# Patient Record
(demographics unavailable — no encounter records)

---

## 2018-03-16 NOTE — RADIOLOGY REPORT (SQ)
EXAM DESCRIPTION: 



KNEE RIGHT 3 VIEWS



CLINICAL HISTORY: 



44 years, Female, knee pain



COMPARISON:

None.



NUMBER OF VIEWS:

3



Findings:



Minimal osteoarthritis of the medial patellofemoral compartment.

Bones, joints, and soft tissues of KNEE RIGHT 3 VIEWS appear

otherwise intact. No significant effusion.



IMPRESSION:



No acute findings.

## 2018-03-16 NOTE — ER DOCUMENT REPORT
ED Extremity Problem, Lower





- General


Chief Complaint: Knee Pain


Stated Complaint: RIGHT KNEE PAIN


Time Seen by Provider: 03/16/18 22:46


Mode of Arrival: Wheelchair


Information source: Patient


Notes: 





44-year-old female presents to ED for complaint of right knee pain since 

Thursday.  She states she does not remember injuring it at all.  She states she 

does have bursitis in the right hip and went to her doctor this week and was 

diagnosed with plantar fasciitis in her foot.  She states the knee just started 

hurting Thursday and she does not remember any injuries.  States she stands on 

her feet for 4-5 hours at a time.  She states she has low back pain with 

sciatica on that side.  Patient refused Tylenol Motrin and ice pack


TRAVEL OUTSIDE OF THE [place] IN LAST 30 DAYS: No





- HPI


Patient complains to provider of: Pain.  No: Injury


Location: Knee


Occurred: Other - Thursday


Where: Home


Onset/Duration: Gradual


Quality of pain: Sharp


Severity: Moderate


Pain Level: 4


Context: Other


Recent injury: Possibly - Not remember any injuries


Associated symptoms: Painful ambulation


Exacerbated by: Hanging down, Movement, Walking


Relieved by: Nothing





- Related Data


Allergies/Adverse Reactions: 


 





flu vaccine Allergy (Uncoded 08/28/17 18:56)


 











Past Medical History





- General


Information source: Patient





- Social History


Smoking Status: Never Smoker


Cigarette use (# per day): No


Chew tobacco use (# tins/day): No


Smoking Education Provided: No


Frequency of alcohol use: None


Drug Abuse: None


Occupation: [employer]


Lives with: Family


Family History: Arthritis, CAD, CVA, Hyperlipidemia, Hypertension, Malignancy, 

Other - Sister has allergy and asthma.  denies: COPD, DM, Thyroid Disfunction


Patient has suicidal ideation: No


Patient has homicidal ideation: No





- Past Medical History


Cardiac Medical History: Reports: Hx Hypertension


Pulmonary Medical History: Reports: Hx Asthma, Hx Bronchitis


EENT Medical History: Reports: None


Neurological Medical History: Reports: None


Endocrine Medical History: Reports: None, Hx Diabetes Mellitus Type 2 - diet 

controlled


Renal/ Medical History: Reports: Hx Kidney Stones


Malignancy Medical History: Reports: None


Musculoskeltal Medical History: Reports Hx Arthritis - Chronic low back pain, 

bursitis and plantar fasciitis all on the right side, Reports Hx 

Musculoskeletal Deformity


Skin Medical History: Reports None


Psychiatric Medical History: Reports: None


Traumatic Medical History: Reports: None


Past Surgical History: Reports: Hx Kidney (Renal Surgery) - lithotripsy





- Immunizations


Immunizations up to date: Yes


Hx Diphtheria, Pertussis, Tetanus Vaccination: No - >5 yrs





Review of Systems





- Review of Systems


Constitutional: No symptoms reported


EENT: No symptoms reported


Cardiovascular: No symptoms reported


Respiratory: No symptoms reported


Gastrointestinal: No symptoms reported


Genitourinary: No symptoms reported


Female Genitourinary: No symptoms reported


Musculoskeletal: Back pain - Chronic, Joint pain - Right knee, Other - Chronic 

low back pain chronic sciatica chronic bursitis in the right hip chronic 

plantar fasciitis in the right foot


Skin: No symptoms reported


Hematologic/Lymphatic: No symptoms reported


Neurological/Psychological: No symptoms reported


-: Yes All other systems reviewed and negative





Physical Exam





- Vital signs


Vitals: 


 











Temp Pulse Resp BP Pulse Ox


 


 97.6 F   81   16   139/94 H  97 


 


 03/16/18 21:36  03/16/18 21:36  03/16/18 21:36  03/16/18 21:36  03/16/18 21:36











Interpretation: Normal





- General


General appearance: Appears well, Alert





- HEENT


Head: Normocephalic, Atraumatic


Eyes: Normal


Pupils: PERRL





- Respiratory


Respiratory status: No respiratory distress


Chest status: Nontender


Breath sounds: Normal


Chest palpation: Normal





- Cardiovascular


Rhythm: Regular


Heart sounds: Normal auscultation


Murmur: No





- Abdominal


Inspection: Normal


Distension: No distension


Bowel sounds: Normal


Tenderness: Nontender


Organomegaly: No organomegaly





- Back


Back: Normal, Tender.  No: Deformity/step-off, CVA tenderness, Vertebra 

tenderness, Scars, Scoliosis, Wounds





- Extremities


General upper extremity: Normal inspection, Nontender, Normal color, Normal ROM

, Normal temperature


General lower extremity: Normal color, Normal ROM, Normal temperature, Normal 

weight bearing.  No: Roly's sign


Hip: Tender - Chronic


Thigh: Normal, Nontender


Knee: Tender, Pain with ROM, Patellar tendon intact, Tender joint line.  No: 

Abrasion, Deformity, Dislocation, Drawer's test instability, Ecchymosis, 

Instability, Laceration, Laxity with valgus stress, Laxity with varus stress, 

Popliteal fossa tender, Unable to bear weight


Calf: Normal, Nontender


Ankle: Normal, Nontender


Foot: Tender, No evidence of FB - Chronic.  No: Abrasion, Ecchymosis, Edema, 

Instability, Laceration, Metatarsal compress. pain, Nail injury, Navicular 

tenderness





- Neurological


Neuro grossly intact: Yes


Cognition: Normal


Orientation: AAOx4


[place] Coma Scale Eye Opening: Spontaneous


[place] Coma Scale Verbal: Oriented


[place] Coma Scale Motor: Obeys Commands


[place] Coma Scale Total: 15


Speech: Normal


Motor strength normal: LUE, RUE, LLE, RLE


Sensory: Normal





- Psychological


Associated symptoms: Normal affect, Normal mood





- Skin


Skin Temperature: Warm


Skin Moisture: Dry


Skin Color: Normal





Course





- Vital Signs


Vital signs: 


 











Temp Pulse Resp BP Pulse Ox


 


 98.3 F   72   16   138/80 H  99 


 


 03/16/18 22:59  03/16/18 23:44  03/16/18 23:44  03/16/18 23:44  03/16/18 23:44














Procedures





- Immobilization


  ** Right Knee


Time completed: 23:35


Immobilizer type: Ace wrap


Performed by: RN


Post-Proc Neuro Vasc Exam: Normal


Alignment checked and good: Yes





Discharge





- Discharge


Clinical Impression: 


 Arthritis of right knee





Condition: Stable


Disposition: HOME, SELF-CARE


Instructions:  Family Physicians / Practices


Additional Instructions: 


Arthritis





     Your symptoms are due to arthritis.  Arthritis is an inflammation of the 

joints.  There are many types -- osteoarthritis (due to "wear and tear"), auto-

immmune arthritis (such as rheumatoid, lupus, Nathanael's, and others), and crystal

-induced arthritis (such as gout and pseudogout).  The physician's examination, 

combined with laboratory tests, will determine the cause of your arthritis.


     All types of arthritis are treated with antiinflammatory medications.  

Other medication may be required for special types of arthritis, or if your 

problem does not respond to the antiinflammatory medicine.


     Local warmth may be helpful.  Move the involved joints through the full 

range of motion daily.  Mild exercise is usually still possible for most 

persons with arthritis (ask your physician).  Swimming provides good exercise 

without damaging the joints.


     Contact the physician if you are worsening in any way.





Acetaminophen





     Acetaminophen may be taken for pain relief or fever control. It's much 

safer than aspirin, offering a wider range of "safe" dosages.  It is safe 

during pregnancy.  Some brand names are Tylenol, Panadol, Datril, Anacin 3, 

Tempra, and Liquiprin. Acetaminophen can be repeated every four hours.  The 

following are maximum recommended dosages:





WEIGHT         Dose             Drops                  Elixir        Chewable(

80mg)


(LBS.)                            drprs=droppers    tsp=teaspoon


6                 40 mg            .4 ml (1/2)


6-11            80 mg            .8 ml (full)            1/2   tsp           1 

      tab


12-16         120 mg           1 1/2 drprs            3/4   tsp           1 1/2

  tabs


17-23         160 mg             2  drprs              1      tsp           2  

     tabs


24-30         240 mg             3  drprs              1 1/2 tsp           3   

    tabs


30-35         320 mg                                     2       tsp           

4       tabs


36-41         360 mg                                     2 1/4 tsp           4 1

/2  tabs


42-47         400 mg                                     2 1/2 tsp           5 

      tabs


48-53         480 mg                                     3       tsp          6

       tabs


54-59         520 mg                                     3  1/4 tsp          6 1

/2 tabs


60-64         560 mg                                     3  1/2 tsp          7 

     tabs 


65-70         600 mg                                     3  3/4 tsp          7 1

/2 tabs


71-76         640 mg                                     4       tsp           

8      tabs


77-82         720 mg                                     4 1/2  tsp           9

      tabs


83-88         800 mg                                     5       tsp           

10     tabs





>89 pounds or adults          650 mg to 900 mg 





Acetaminophen can be repeated every four hours. Maximum daily dose not to 

exceed 4000 mg.





   These maximum recommended dosages are slightly higher than the dosages 

written on the product container, but these dosages are very safe and well 

below the toxic dosage for acetaminophen.





Ibuprofen





     Ibuprofen is an excellent, safe drug for pain control.  In addition, it 

has potent antiinflammatory effects which are beneficial, especially in the 

treatment of injuries, arthritis, or tendonitis. It's best to take ibuprofen 

with food.  Persons with ulcer disease or allergy to aspirin should notify 

their physician of this before taking ibuprofen.


     Take the medication exactly as prescribed.  Don't take additional doses 

unless instructed to do so by your doctor.  If you develop wheezing, shortness 

of breath, hives, faintness, stomach pain, vomiting, or dark black stools, 

return for re-evaluation at once.





FOLLOW-UP CARE:


If you have been referred to a physician for follow-up care, call the physician

s office for an appointment as you were instructed or within the next two days.

  If you experience worsening or a significant change in your symptoms, notify 

the physician immediately or return to the Emergency Department at any time for 

re-evaluation.


Forms:  Elevated Blood Pressure, Return to Work


Referrals: 


AIDAN BECERRA MD [ACTIVE STAFF] - Follow up as needed

## 2018-03-26 NOTE — ER DOCUMENT REPORT
ED General





- General


Chief Complaint: Eye Pain


Stated Complaint: EYE PAIN


Time Seen by Provider: 03/26/18 21:41


Mode of Arrival: Ambulatory


Notes: 





Patient is a 44 year old female who does not wear corrective lenses presents 

with left eye pain.  Patient reports that this started after she woke up from a 

nap in which she fell asleep while wearing a pair of sunglasses that belonged 

to her .  She states that since that time she has had a burning, constant

, scratching pain to her left eye.  She tried saline flushes with minimal 

improvement of the pain.  Nothing worsens the pain.  She denies any history of 

similar symptoms in the past.  No changes to visual acuity.  She has been 

unable to see her ophthalmologist regarding today's concerns.  She denies any 

discharge from the eye.


TRAVEL OUTSIDE OF THE [place] IN LAST 30 DAYS: No





- Related Data


Allergies/Adverse Reactions: 


 





flu vaccine Allergy (Uncoded 08/28/17 18:56)


 











Past Medical History





- General


Information source: Patient





- Social History


Smoking Status: Never Smoker


Frequency of alcohol use: None


Drug Abuse: None


Lives with: Spouse/Significant other


Family History: Arthritis, CAD, CVA, Hyperlipidemia, Hypertension, Malignancy, 

Other - Sister has allergy and asthma.  denies: COPD, DM, Thyroid Disfunction


Patient has suicidal ideation: No


Patient has homicidal ideation: No





- Past Medical History


Cardiac Medical History: Reports: Hx Hypertension


Pulmonary Medical History: Reports: Hx Asthma, Hx Bronchitis


Endocrine Medical History: Reports: Hx Diabetes Mellitus Type 2 - diet 

controlled


Renal/ Medical History: Reports: Hx Kidney Stones.  Denies: Hx Peritoneal 

Dialysis


Musculoskeltal Medical History: Reports Hx Arthritis - Chronic low back pain, 

bursitis and plantar fasciitis all on the right side, Reports Hx 

Musculoskeletal Deformity


Past Surgical History: Reports: Hx Kidney (Renal Surgery) - lithotripsy





- Immunizations


Immunizations up to date: Yes


Hx Diphtheria, Pertussis, Tetanus Vaccination: No - >5 yrs





Review of Systems





- Review of Systems


Notes: 





Constitutional: Negative for fever.


HENT: Negative for sore throat.


Eyes: Negative for visual changes positive for eye pain


Cardiovascular: Negative for chest pain.


Respiratory: Negative for shortness of breath.


Gastrointestinal: Negative for abdominal pain, vomiting or diarrhea.


Genitourinary: Negative for dysuria.


Musculoskeletal: Negative for back pain.


Skin: Negative for rash.


Neurological: Negative for headaches, weakness or numbness.





10 point ROS negative except as marked above and in HPI.





Physical Exam





- Vital signs


Vitals: 


 











Temp Pulse Resp BP Pulse Ox


 


 97.6 F   84   20   148/82 H  98 


 


 03/26/18 19:44  03/26/18 19:44  03/26/18 19:44  03/26/18 19:44  03/26/18 19:44











Interpretation: Hypertensive


Notes: 





PHYSICAL EXAMINATION:





GENERAL: Well-appearing, well-nourished and in no acute distress.





HEAD: Atraumatic, normocephalic.





EYES:  sclera anicteric, there is a corneal abrasion to the lateral inferior 

portion of the left eye with an apparent corneal flap.  This is visualized 

under fluorescein staining.  Extraocular motions intact.  Pupillary response 

equal and reactive bilaterally.  Visual acuity 20/20 bilaterally at the 

bedside.  Mild conjunctival injection on the left, normal on the right.





ENT: Moist mucous membranes.





NECK: Normal range of motion





LUNGS: Normal work of breathing





HEART: 2+ radial pulses bilaterally





EXTREMITIES: no pitting or edema.  No cyanosis.





NEUROLOGICAL: No focal neurological deficits. Moves all extremities 

spontaneously and on command.





PSYCH: Normal mood, normal affect.





SKIN: Warm, Dry, normal turgor, no rashes or lesions noted.





Course





- Re-evaluation


Re-evalutation: 





03/26/18 23:39


Patient presents with a corneal abrasion to the left eye with an associated 

corneal flap.  There is no evidence of retained foreign body.  Extraocular 

motions are intact.  Pupillary responses within normal limits.  She does not 

use contacts.  Patient will be started on Polytrim drops, ketorolac drops for 

pain.  I have instructed her to follow up with her optometrist or 

ophthalmologist in the morning.  At this time will discharge with return 

precautions and follow-up recommendations.  Verbal discharge instructions given 

a the bedside and opportunity for questions given. Medication warnings 

reviewed. Patient is in agreement with this plan and has verbalized 

understanding of return precautions and the need for eye care follow-up.





- Vital Signs


Vital signs: 


 











Temp Pulse Resp BP Pulse Ox


 


 97.6 F   84   20   148/82 H  98 


 


 03/26/18 19:44  03/26/18 19:44  03/26/18 19:44  03/26/18 19:44  03/26/18 19:44














Discharge





- Discharge


Clinical Impression: 


 Fold in corneal flap of left eye





Corneal abrasion, left


Qualifiers:


 Encounter type: initial encounter Qualified Code(s): S05.02XA - Injury of 

conjunctiva and corneal abrasion without foreign body, left eye, initial 

encounter





Condition: Good


Disposition: HOME, SELF-CARE


Additional Instructions: 


You have a corneal abrasion.  This should improve in the next several days.  

You should apply the eye drops to the affected eye 3 times daily.  Follow-up 

with your eye doctor at your earliest ability preferably tomorrow.  Return if 

you have decreased vision, worsening pain, increased drainage from the eye, you 

notice redness or puffiness around the eye, you develop a fever greater than 101

F, or you have any other symptoms that are concerning to you.

## 2018-03-26 NOTE — ER DOCUMENT REPORT
ED Medical Screen (RME)





- General


Chief Complaint: Eye Pain


Stated Complaint: EYE PAIN


Time Seen by Provider: 03/26/18 21:41


Mode of Arrival: Ambulatory


Information source: Patient


Notes: 





44-year-old female presents to ED for complaint of left eye pain that started 

this evening when they pulled into [place] about 645.  She has a small 

little whitish foreign material to the 5 o'clock position on her left eye.  It 

does not flush it with saline.  Was treated with 1 drop of tetracaine to the 

left eye to help with the pain until she is observed in the main ED.











I have greeted and performed a rapid initial assessment of this patient.  A 

comprehensive ED assessment and evaluation of the patient, analysis of test 

results and completion of medical decision making process will be conducted by 

an additional ED providers.


TRAVEL OUTSIDE OF THE [place] IN LAST 30 DAYS: No





- Related Data


Allergies/Adverse Reactions: 


 





flu vaccine Allergy (Uncoded 08/28/17 18:56)


 











Past Medical History





- Social History


Family history: Reviewed & Not Pertinent





- Past Medical History


Cardiac Medical History: Reports: Hx Hypertension


Pulmonary Medical History: Reports: Hx Asthma, Hx Bronchitis


Endocrine Medical History: Reports: Hx Diabetes Mellitus Type 2 - diet 

controlled


Renal/ Medical History: Reports: Hx Kidney Stones.  Denies: Hx Peritoneal 

Dialysis


Musculoskeltal Medical History: Reports Hx Arthritis - Chronic low back pain, 

bursitis and plantar fasciitis all on the right side, Reports Hx 

Musculoskeletal Deformity


Past Surgical History: Reports: Hx Kidney (Renal Surgery) - lithotripsy





- Immunizations


Immunizations up to date: Yes


Hx Diphtheria, Pertussis, Tetanus Vaccination: No - >5 yrs





Physical Exam





- Vital signs


Vitals: 





 











Temp Pulse Resp BP Pulse Ox


 


 97.6 F   84   20   148/82 H  98 


 


 03/26/18 19:44  03/26/18 19:44  03/26/18 19:44  03/26/18 19:44  03/26/18 19:44














Course





- Vital Signs


Vital signs: 





 











Temp Pulse Resp BP Pulse Ox


 


 97.6 F   84   20   148/82 H  98 


 


 03/26/18 19:44  03/26/18 19:44  03/26/18 19:44  03/26/18 19:44  03/26/18 19:44

## 2018-05-27 NOTE — EKG REPORT
SEVERITY:- OTHERWISE NORMAL ECG -

SINUS RHYTHM

ATRIAL PREMATURE COMPLEX

:

Confirmed by: Luis Matute MD 27-May-2018 14:44:53

## 2018-05-27 NOTE — RADIOLOGY REPORT (SQ)
EXAM DESCRIPTION:  CHEST 2 VIEWS



COMPLETED DATE/TIME:  5/27/2018 11:10 am



REASON FOR STUDY:  syncope and coughing green sputum



COMPARISON:  11/7/2016



EXAM PARAMETERS:  NUMBER OF VIEWS: two views

TECHNIQUE: Digital Frontal and Lateral radiographic views of the chest acquired.

RADIATION DOSE: NA

LIMITATIONS: none



FINDINGS:  LUNGS AND PLEURA: No opacities, masses or pneumothorax. No pleural effusion.

MEDIASTINUM AND HILAR STRUCTURES: No masses or contour abnormalities.

HEART AND VASCULAR STRUCTURES: Heart normal size.  No evidence for failure.

BONES: No acute findings.

HARDWARE: None in the chest.

OTHER: No other significant finding.



IMPRESSION:  NO ACUTE RADIOGRAPHIC FINDING IN THE CHEST.



TECHNICAL DOCUMENTATION:  JOB ID:  [id number]

 2011 [employer]- All Rights Reserved



Reading location - IP/workstation name: [email or web address]

## 2018-05-27 NOTE — ER DOCUMENT REPORT
ED Syncope and Near Syncope





- General


Chief Complaint: Dizziness


Stated Complaint: COUGH


Time Seen by Provider: 05/27/18 10:41


Mode of Arrival: Ambulatory


Information source: Patient


Notes: 





Chief complaint: Passed out








History of complain:( obtained from----patient) 44 years old male with a 

history of asthma, hypertension, last evening was feeling dizzy when she walked 

to the door and felt lightheaded and passed out.  She does not remember any 

symptoms prior to that or after that.  She cannot recollect how long she was 

out.  When she woke up and requested the  to bring her to the ED.  

Apparently he refused.


This morning she decided to come to the ED.  And came with her son.  She claims 

that she could not dial 911.


Currently feeling lightheaded dizzy when she sits up or stands up.  She takes 

10 mg of lisinopril, last blood pressure check at the doctor's office was 140 

systole.


Still having on and off wheezing.


Currently denies any headache focal weakness numbness tingling sensation.  

Denies any chest pain palpitation or diaphoresis.  Denies any nausea vomiting








Onset: As about


Severity: Mild to moderate


Quality: Dull


Context: Sitting or standing make it worse


Exacerbating factor and relieving factors: Sitting or standing make it was 

laying down is comfortable











REVIEW OF SYSTEMS:


CONSTITUTIONAL :  Denies fever,  chills, or sweats.  Denies recent illness.


EENT:   Denies eye, ear, throat, or mouth pain or symptoms.  Denies nasal or 

sinus congestion or discharge.  Denies throat, tongue, or mouth swelling or 

difficulty swallowing.


CARDIOVASCULAR:  Denies chest pain.  Denies palpitations or racing or irregular 

heart beat.  Denies ankle edema.


RESPIRATORY:  Denies cough, cold, or chest congestion.  Denies shortness of 

breath, difficulty breathing, or wheezing.


GASTROINTESTINAL:  Denies  distention.  Denies nausea, vomiting, or diarrhea.  

Denies blood in vomitus, stools, or per rectum.  Denies black, tarry stools.  

Denies constipation. 


GENITOURINARY:  Denies difficulty urinating, painful urination, burning, 

frequency, blood in urine, or discharge.


FEMALE  GENITOURINARY:  Denies vaginal bleeding, heavy or abnormal periods, 

irregular periods.  Denies vaginal discharge or odor. 


MUSCULOSKELETAL:  Denies back or neck pain or stiffness.  Denies joint pain or 

swelling.


SKIN:   Denies rash, lesions or sores.


HEMATOLOGIC :   Denies easy bruising or bleeding.


LYMPHATIC:  Denies swollen, enlarged glands.


NEUROLOGICAL:  Denies confusion or altered mental status.   Denies headache.  

Denies weakness or paralysis or loss of use of either side.  Denies problems 

with gait or speech.  Denies sensory loss, numbness, or tingling.  Denies 

seizures.


PSYCHIATRIC:  Denies anxiety or stress.  Denies depression, suicidal ideation, 

or homicidal ideation.





ALL OTHER SYSTEMS REVIEWED AND NEGATIVE.











PHYSICAL EXAMINATION:





GENERAL: Well-appearing, well-nourished and in no acute distress.  Not seems to 

be in any acute distress





HEAD: Atraumatic, normocephalic.





EYES: Pupils equal round and reactive to light, extraocular movements intact, 

conjunctiva are normal.





ENT: Nares patent, oropharynx clear without exudates.  Moist mucous membranes.





NECK: Normal range of motion, supple without lymphadenopathy





LUNGS: Breath sounds clear to auscultation bilaterally and equal.  Diffuse 

bilateral wheezing, no rales mild to moderate.





HEART: Regular rate and rhythm without murmurs





ABDOMEN: Soft, nontender, nondistended abdomen.  No guarding, no rebound.  No 

masses appreciated.





Examination of genitals-deferred





Musculoskeletal: Normal range of motion, no pitting or edema.  No cyanosis.





NEUROLOGICAL: Cranial nerves grossly intact.  Normal speech, normal gait.  

Normal sensory, motor exams





PSYCH: Normal mood, normal affect.





SKIN: Warm, Dry, normal turgor, no rashes or lesions noted.

















Dictation was performed using Dragon voice recognition software


TRAVEL OUTSIDE OF THE [place] IN LAST 30 DAYS: No





- HPI


Notes: 





Dictated





- Related Data


Allergies/Adverse Reactions: 


 





adhesive Allergy (Verified 05/27/18 10:27)


 


flu vaccine Allergy (Uncoded 08/28/17 18:56)


 











Past Medical History





- General


Information source: Patient





- Social History


Smoking Status: Never Smoker


Chew tobacco use (# tins/day): No


Frequency of alcohol use: None


Drug Abuse: None


Family History: Arthritis, CAD, CVA, Hyperlipidemia, Hypertension, Malignancy, 

Other - Sister has allergy and asthma.  denies: COPD, DM, Thyroid Disfunction


Patient has suicidal ideation: No


Patient has homicidal ideation: No





- Past Medical History


Cardiac Medical History: Reports: Hx Hypertension


Pulmonary Medical History: Reports: Hx Asthma, Hx Bronchitis


Endocrine Medical History: Reports: Hx Diabetes Mellitus Type 2 - diet 

controlled


Renal/ Medical History: Reports: Hx Kidney Stones.  Denies: Hx Peritoneal 

Dialysis


Musculoskeltal Medical History: Reports Hx Arthritis - Chronic low back pain, 

bursitis and plantar fasciitis all on the right side, Reports Hx 

Musculoskeletal Deformity


Past Surgical History: Reports: Hx Kidney (Renal Surgery) - lithotripsy





- Immunizations


Immunizations up to date: Yes


Hx Diphtheria, Pertussis, Tetanus Vaccination: No - >5 yrs





Review of Systems





- Review of Systems


Notes: 





Dictated





Physical Exam





- Vital signs


Vitals: 


 











Temp Pulse Resp BP Pulse Ox


 


 98.3 F   73   20   135/73 H  98 


 


 05/27/18 10:32  05/27/18 10:32  05/27/18 10:32  05/27/18 10:32  05/27/18 10:32














- Notes


Notes: 





Dictated





Course





- Re-evaluation


Re-evalutation: 





05/27/18 12:41


She was given IV fluid, feeling better, asked to cut down her blood pressure 

medicine from 10 mg of lisinopril to 5.





- Vital Signs


Vital signs: 


 











Temp Pulse Resp BP Pulse Ox


 


 98.3 F   73   20   135/73 H  98 


 


 05/27/18 10:32  05/27/18 10:32  05/27/18 10:42  05/27/18 10:32  05/27/18 10:32














- Laboratory


Result Diagrams: 


 05/27/18 11:23





 05/27/18 11:23


Laboratory results interpreted by me: 


 











  05/27/18





  11:23


 


WBC  11.3 H


 


Hgb  10.0 L


 


Hct  31.4 L


 


MCV  72 L


 


MCH  22.7 L


 


MCHC  31.7 L


 


RDW  18.3 H


 


Plt Count  480 H














- Diagnostic Test


Radiology reviewed: Reports reviewed - CT of the brain-reported by radiologist 

as normal Chest x-ray reported by radiologist as normal





- EKG Interpretation by Me


EKG shows normal: Sinus rhythm, Axis


Rate: Normal


Rhythm: NSR - Normal sinus rhythm at the rate of 63 bpm normal axis no acute ST 

elevation ST depression T-wave inversion noted.





Discharge





- Discharge


Clinical Impression: 


 Hypotensive episode





Syncope


Qualifiers:


 Syncope type: unspecified Qualified Code(s): R55 - Syncope and collapse





Exacerbation of asthma


Qualifiers:


 Asthma severity: moderate Asthma persistence: unspecified Qualified Code(s): 

J45.901 - Unspecified asthma with (acute) exacerbation





Condition: Fair


Disposition: HOME, SELF-CARE


Instructions:  Dizziness (Critical access hospital), Asthma (Critical access hospital)


Prescriptions: 


Albuterol Sulfate [Proair HFA Inhalation Aerosol 8.5 gm MDI] 2 puff IH Q4H PRN #

1 mdi


 PRN Reason: 


Prednisone [Deltasone 20 mg Tablet] 1 tab PO DAILY 7 Days #7 tablet

## 2018-05-27 NOTE — RADIOLOGY REPORT (SQ)
EXAM DESCRIPTION:  CT HEAD WITHOUT



COMPLETED DATE/TIME:  5/27/2018 11:41 am



REASON FOR STUDY:  Head injury



COMPARISON:  None.



TECHNIQUE:  Axial images acquired through the brain without intravenous contrast.  Images reviewed wi
th bone, brain and subdural windows.  Additional sagittal and coronal reconstructions were generated.
 Images stored on PACS.

All CT scanners at this facility use dose modulation, iterative reconstruction, and/or weight based d
osing when appropriate to reduce radiation dose to as low as reasonably achievable (ALARA).

[facility]: Dose Right  [facility]: CareDose    [facility]: Dose Right    [facility]: Teradose 4D    [facility]: Smart Technologies



RADIATION DOSE:  CT Rad equipment meets quality standard of care and radiation dose reduction techniq
ues were employed. CTDIvol: 53.2 mGy. DLP: 964 mGy-cm. mGy.



LIMITATIONS:  None.



FINDINGS:  VENTRICLES: Normal size and contour.

CEREBRUM: No masses.  No hemorrhage.  No midline shift.  No evidence for acute infarction. Normal gra
y/white matter differentiation. No areas of low density in the white matter.

CEREBELLUM: No masses.  No hemorrhage.  No alteration of density.  No evidence for acute infarction.

EXTRAAXIAL SPACES: No fluid collections.  No masses.

ORBITS AND GLOBE: No intra- or extraconal masses.  Normal contour of globe without masses.

CALVARIUM: No fracture.

PARANASAL SINUSES: Fluid in the ethmoid sinuses.  Mucosal thickening in the maxillary sinuses.

SOFT TISSUES: No mass or hematoma.

OTHER: No other significant finding.



IMPRESSION:  NORMAL BRAIN CT WITHOUT CONTRAST.

EVIDENCE OF ACUTE STROKE: NO.



COMMENT:  Quality ID # [id number]: Final reports with documentation of one or more dose reduction techniques
 (e.g., Automated exposure control, adjustment of the mA and/or kV according to patient size, use of 
iterative reconstruction technique)



TECHNICAL DOCUMENTATION:  JOB ID:  [id number]

 2011 [employer]- All Rights Reserved



Reading location - IP/workstation name: [email or web address]

## 2018-05-27 NOTE — ER DOCUMENT REPORT
ED Medical Screen (RME)





- General


Chief Complaint: Dizziness


Stated Complaint: COUGH


Time Seen by Provider: 05/27/18 10:41


Notes: 





RAPID MEDICAL EVALUATION DISCLOSURE


I have seen this patient as part of a Rapid Medical Evaluation and, if 

applicable, placed any initially appropriate orders. The patient will be seen 

and fully evaluated, including a full history and physical exam, by a provider (

in Main ED or Fast Track) when a room becomes available.





------------------------------------------------------------------





44-year-old female here with complaints of wheezing shortness of breath and 

cough productive of green sputum ongoing for the past 1 week (has used her 

inhaler with minimal relief).  Yesterday, she started to have bilateral arm 

pain and numbness,, generalized weakness, diaphoresis, lightheadedness/dizziness

, and passed out while standing.  She did not have any chest pain at the time.  

She denies hitting her head after the fall.  She has been drinking plenty of 

water and staying hydrated.





EXAM


Minimal and expiratory wheezes diffusely


Normal aeration throughout


RRR








TRAVEL OUTSIDE OF THE [place] IN LAST 30 DAYS: No





- Related Data


Allergies/Adverse Reactions: 


 





adhesive Allergy (Verified 05/27/18 10:27)


 


flu vaccine Allergy (Uncoded 08/28/17 18:56)


 











Past Medical History





- Social History


Family history: Reviewed & Not Pertinent





- Past Medical History


Cardiac Medical History: Reports: Hx Hypertension


Pulmonary Medical History: Reports: Hx Asthma, Hx Bronchitis


Endocrine Medical History: Reports: Hx Diabetes Mellitus Type 2 - diet 

controlled


Renal/ Medical History: Reports: Hx Kidney Stones.  Denies: Hx Peritoneal 

Dialysis


Musculoskeltal Medical History: Reports Hx Arthritis - Chronic low back pain, 

bursitis and plantar fasciitis all on the right side, Reports Hx 

Musculoskeletal Deformity


Past Surgical History: Reports: Hx Kidney (Renal Surgery) - lithotripsy





- Immunizations


Immunizations up to date: Yes


Hx Diphtheria, Pertussis, Tetanus Vaccination: No - >5 yrs





Physical Exam





- Vital signs


Vitals: 





 











Temp Pulse Resp BP Pulse Ox


 


 98.3 F   73   20   135/73 H  98 


 


 05/27/18 10:32  05/27/18 10:32  05/27/18 10:32  05/27/18 10:32  05/27/18 10:32














Course





- Vital Signs


Vital signs: 





 











Temp Pulse Resp BP Pulse Ox


 


 98.3 F   73   20   135/73 H  98 


 


 05/27/18 10:32  05/27/18 10:32  05/27/18 10:32  05/27/18 10:32  05/27/18 10:32

## 2018-07-22 NOTE — ER DOCUMENT REPORT
HPI





- HPI


Patient complains to provider of: Facial pain nose pressure headache


Onset: Other - Wednesday


Onset/Duration: Gradual


Quality of pain: Pressure


Severity: Severe


Pain Level: 5


Associated Symptoms: Headache, Sinus pain/drainage, Other - Pain to nose and 

bilateral facial pain


Exacerbated by: Other - Bending forward palpation of the nose or the face


Relieved by: Denies


Similar symptoms previously: Yes


Recently seen / treated by doctor: No





- ROS


ROS below otherwise negative: Yes





- CONSTITUTIONAL


Constitutional: DENIES: Fever, Chills





- EENT


Notes: 





Patient is very tender to bilateral cheekbones and nose.  She states she has 

severe pressure in her nose and face when she leans forward.  Patient is 

afebrile at this time.  Nasal turbinates red and swollen with clear drainage





- NEURO


Neurology: REPORTS: Headache





- CARDIOVASCULAR


Cardiovascular: DENIES: Chest pain





- RESPIRATORY


Respiratory: DENIES: Trouble Breathing, Coughing





- GASTROINTESTINAL


Gastrointestinal: DENIES: Abdominal Pain, Nausea, Patient vomiting, Diarrhea, 

Constipation, Black / Bloody Stools





- URINARY


Urinary: DENIES: Dysuria, Urgency, Frequency





- REPRODUCTIVE


Reproductive: DENIES: Pregnant:, Postmenopausal, Abnormal bleeding / discharge





- MUSCULOSKELETAL


Musculoskeletal: DENIES: Extremity pain, Back Pain, Neck Pain, Swelling





- DERM


Skin Color: Normal


Skin Problems: None





Past Medical History





- General


Information source: Patient





- Social History


Smoking Status: Former Smoker


Cigarette use (# per day): No


Chew tobacco use (# tins/day): No


Smoking Education Provided: No


Frequency of alcohol use: None


Drug Abuse: None


Lives with: Family


Family History: Arthritis, CAD, CVA, Hyperlipidemia, Hypertension, Malignancy, 

Other - Sister has allergy and asthma.  denies: COPD, DM, Thyroid Disfunction


Patient has suicidal ideation: No


Patient has homicidal ideation: No





- Past Medical History


Cardiac Medical History: Reports: Hx Hypertension


Pulmonary Medical History: Reports: Hx Asthma, Hx Bronchitis


EENT Medical History: Reports: None


Neurological Medical History: Reports: None


Endocrine Medical History: Reports: Hx Diabetes Mellitus Type 2 - diet 

controlled


Renal/ Medical History: Reports: Hx Kidney Stones


Malignancy Medical History: Reports: None


GI Medical History: Reports: None


Musculoskeletal Medical History: Reports Hx Arthritis - Chronic low back pain, 

bursitis and plantar fasciitis all on the right side, Reports Hx 

Musculoskeletal Deformity


Skin Medical History: Reports None


Psychiatric Medical History: Reports: None


Traumatic Medical History: Reports: None


Infectious Medical History: Reports: None


Past Surgical History: Reports: Hx Kidney (Renal Surgery) - lithotripsy





- Immunizations


Immunizations up to date: Yes


Hx Diphtheria, Pertussis, Tetanus Vaccination: No - >5 yrs





Vertical Provider Document





- CONSTITUTIONAL


Agree With Documented VS: Yes


Exam Limitations: No Limitations


General Appearance: WD/WN, Mild Distress





- INFECTION CONTROL


TRAVEL OUTSIDE OF THE [place] IN LAST 30 DAYS: No





- HEENT


HEENT: Atraumatic, Normocephalic, PERRLA.  negative: Normal ENT Exam


Notes: 





Tenderness to maxillary sinuses, swollen red nasal turbinates, pain to 

bilateral sides of nose, afebrile at this time.  Increased pain when he leans 

forward to sinuses.





- RESPIRATORY


Respiratory: Breath Sounds Normal, No Respiratory Distress





- CARDIOVASCULAR


Cardiovascular: Regular Rate, Regular Rhythm





- MUSCULOSKELETAL/EXTREMETIES


Musculoskeletal/Extremeties: MAEW, FROM, Non-Tender





- NEURO


Level of Consciousness: Awake, Alert, Appropriate





- DERM


Integumentary: Warm, Dry, No Rash





Course





- Re-evaluation


Re-evalutation: 





07/22/18 02:35


Patient was very tender to the bilateral sinuses.  She also had increased pain 

when she leaned forward in bilateral sinuses.  She was very tender to touch on 

her nose.  She was afebrile.  She was treated with azithromycin in the 

emergency room and discharged home with prescription for azithromycin.  Patient 

was also treated with Tylenol in the emergency room.  Patient has been 

instructed to follow-up with her primary doctor.





- Vital Signs


Vital signs: 


 











Temp Pulse Resp BP Pulse Ox


 


 97.4 F   84   20   102/63   97 


 


 07/21/18 22:40  07/21/18 22:40  07/21/18 22:40  07/21/18 22:40  07/21/18 22:40














Discharge





- Discharge


Clinical Impression: 


Sinusitis


Qualifiers:


 Sinusitis location: maxillary Chronicity: acute Recurrence: non-recurrent 

Qualified Code(s): J01.00 - Acute maxillary sinusitis, unspecified





Condition: Stable


Disposition: HOME, SELF-CARE


Additional Instructions: 


Sinusitis





     You have sinusitis, an infection of the sinus cavities of the face.  The 

sinuses are air-filled chambers which open into the inside of the nose.  

Bacteria and pus fill a sinus, causing pain, drainage, and fever.


     Sinusitis is treated with antibiotics.  Often, expectorants (to thin the 

sinus mucous) or decongestants (to reduce swelling) are prescribed as well.  

Healing requires seven to 10 days.


     Avoid chemical fumes, pollens, dusts, and smoke (especially cigarette smoke

).  Keep the air humidified in your bedroom and work area and take plenty of 

liquids by mouth.


     This condition can be serious if the infection spreads.  If your symptoms 

worsen, or if you develop severe headache, high fever, stiff neck, or a rash, 

you must call the doctor or return for re-evaluation.





Azithromycin





     Azithromycin (Zithromax) is a broad spectrum antibiotic in the same class 

as erythromycin.  It can treat a variety of bacterial infections, but is most 

frequently used for respiratory infections.


     Azithromycin is extremely long-lasting.  It accumulates in body tissues 

and continues to kill bacteria for many days.


     In order to improve absorption, Azithromycin should be taken at least one 

hour before or two hours after a meal.  It does not have the same strong 

tendency to upset the stomach as erythromycin and is usually very well 

tolerated.


     Patients who have had a rash or other true allergic reactions to 

erythromycin should not take this medication.  Call if you develop 

gastrointestinal distress, severe diarrhea, rash, hives, itching, or shortness 

of breath.





Acetaminophen





     Acetaminophen may be taken for pain relief or fever control. It's much 

safer than aspirin, offering a wider range of "safe" dosages.  It is safe 

during pregnancy.  Some brand names are Tylenol, Panadol, Datril, Anacin 3, 

Tempra, and Liquiprin. Acetaminophen can be repeated every four hours.  The 

following are maximum recommended dosages:





WEIGHT         Dose             Drops                  Elixir        Chewable(

80mg)


(LBS.)                            drprs=droppers    tsp=teaspoon


6                 40 mg            .4 ml (1/2)


6-11            80 mg            .8 ml (full)            1/2   tsp           1 

      tab


12-16         120 mg           1 1/2 drprs            3/4   tsp           1 1/2

  tabs


17-23         160 mg             2  drprs              1      tsp           2  

     tabs


24-30         240 mg             3  drprs              1 1/2 tsp           3   

    tabs


30-35         320 mg                                     2       tsp           

4       tabs


36-41         360 mg                                     2 1/4 tsp           4 1

/2  tabs


42-47         400 mg                                     2 1/2 tsp           5 

      tabs


48-53         480 mg                                     3       tsp          6

       tabs


54-59         520 mg                                     3  1/4 tsp          6 1

/2 tabs


60-64         560 mg                                     3  1/2 tsp          7 

     tabs 


65-70         600 mg                                     3  3/4 tsp          7 1

/2 tabs


71-76         640 mg                                     4       tsp           

8      tabs


77-82         720 mg                                     4 1/2  tsp           9

      tabs


83-88         800 mg                                     5       tsp           

10     tabs





>89 pounds or adults          650 mg to 900 mg 





Acetaminophen can be repeated every four hours. Maximum daily dose not to 

exceed 4000 mg.





   These maximum recommended dosages are slightly higher than the dosages 

written on the product container, but these dosages are very safe and well 

below the toxic dosage for acetaminophen.








Use saline nasal spray and Flonase for your discomfort.





FOLLOW-UP CARE:


If you have been referred to a physician for follow-up care, call the physician

s office for an appointment as you were instructed or within the next two days.

  If you experience worsening or a significant change in your symptoms, notify 

the physician immediately or return to the Emergency Department at any time for 

re-evaluation.


Prescriptions: 


Azithromycin 250 mg PO DAILY #4 tablet


Referrals: 


ZULMA ARIZMENDI MD [Primary Care Provider] - 07/23/18
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
## 2018-11-28 NOTE — ER DOCUMENT REPORT
ED GI/





- General


Chief Complaint: Urinary Problem


Stated Complaint: URINARY ISSUES


Time Seen by Provider: 11/28/18 19:55


Mode of Arrival: Ambulatory


Information source: Patient


TRAVEL OUTSIDE OF THE [place] IN LAST 30 DAYS: No





- HPI


Patient complains to provider of: Dysuria


Onset: Yesterday


Timing/Duration: Persistent


Quality of pain: Burning, Pressure


Severity at maximum: Moderate


Severity in ED: Moderate


Pain Level: 4


Location: Suprapubic


Associated symptoms: None


Exacerbated by: Denies


Relieved by: Denies


Similar symptoms previously: Yes


Recently seen / treated by doctor: No


Notes: 





11/28/18 20:53


Patient is a 45-year-old female presenting to the emergency room today 

complaining of dysuria for 2 days, she denies any fever or chills, no nausea, 

vomiting or diarrhea, no hematuria, history of urinary tract infections in the 

past with similar symptoms, she also reports that she was wheezing upon arrival 

to the emergency department but used her albuterol inhaler and that is now 

resolved, patient also mentions a small area of erythema with tenderness on her 

left abdominal wall that is been present for the past 2 days that is she is 

worried is a spider bite





- Related Data


Allergies/Adverse Reactions: 


 





adhesive Allergy (Verified 05/27/18 10:27)


 


flu vaccine Allergy (Uncoded 08/28/17 18:56)


 











Past Medical History





- General


Information source: Patient





- Social History


Smoking Status: Never Smoker


Family History: Arthritis, CAD, CVA, Hyperlipidemia, Hypertension, Malignancy, 

Other - Sister has allergy and asthma.  denies: COPD, DM, Thyroid Disfunction


Patient has suicidal ideation: No


Patient has homicidal ideation: No





- Past Medical History


Cardiac Medical History: Reports: Hx Hypertension


Pulmonary Medical History: Reports: Hx Asthma, Hx Bronchitis


Endocrine Medical History: Reports: Hx Diabetes Mellitus Type 2 - diet 

controlled


Renal/ Medical History: Reports: Hx Kidney Stones.  Denies: Hx Peritoneal 

Dialysis


Musculoskeletal Medical History: Reports Hx Arthritis - Chronic low back pain, 

bursitis and plantar fasciitis all on the right side, Reports Hx 

Musculoskeletal Deformity


Past Surgical History: Reports: Hx Kidney (Renal Surgery) - lithotripsy





- Immunizations


Immunizations up to date: Yes


Hx Diphtheria, Pertussis, Tetanus Vaccination: No - >5 yrs





Review of Systems





- Review of Systems


Constitutional: No symptoms reported


EENT: No symptoms reported


Cardiovascular: No symptoms reported


Respiratory: See HPI


Gastrointestinal: No symptoms reported


Genitourinary: See HPI


Female Genitourinary: No symptoms reported


Musculoskeletal: No symptoms reported


Skin: See HPI


Hematologic/Lymphatic: No symptoms reported


Neurological/Psychological: No symptoms reported


-: Yes All other systems reviewed and negative





Physical Exam





- Vital signs


Vitals: 


 











Temp Pulse Resp BP Pulse Ox


 


 98.2 F   101 H  19   140/85 H  99 


 


 11/28/18 19:48  11/28/18 19:48  11/28/18 19:48  11/28/18 19:48  11/28/18 19:48











Interpretation: Normal





- General


General appearance: Appears well, Alert





- HEENT


Head: Normocephalic, Atraumatic


Eyes: Normal


Pupils: PERRL





- Respiratory


Respiratory status: No respiratory distress


Chest status: Nontender


Breath sounds: Normal


Chest palpation: Normal





- Cardiovascular


Rhythm: Regular


Heart sounds: Normal auscultation


Murmur: No





- Abdominal


Inspection: Other - 2 cm area of erythema with slight induration to the left 

lower abdominal wall anteriorly, mild tenderness to palpate, no fluctuance, no 

drainage


Distension: No distension


Bowel sounds: Normal


Tenderness: Tender - Suprapubic


Organomegaly: No organomegaly





- Back


Back: Normal, Nontender





- Extremities


General upper extremity: Normal inspection, Nontender, Normal color, Normal ROM

, Normal temperature


General lower extremity: Normal inspection, Nontender, Normal color, Normal ROM

, Normal temperature, Normal weight bearing.  No: Roly's sign





- Neurological


Neuro grossly intact: Yes


Cognition: Normal


Orientation: AAOx4


Juana Coma Scale Eye Opening: Spontaneous


Juana Coma Scale Verbal: Oriented


Russell Coma Scale Motor: Obeys Commands


Russell Coma Scale Total: 15


Speech: Normal


Motor strength normal: LUE, RUE, LLE, RLE


Sensory: Normal





- Psychological


Associated symptoms: Normal affect, Normal mood





- Skin


Skin Temperature: Warm


Skin Moisture: Dry


Skin Color: Normal





Course





- Re-evaluation


Re-evalutation: 





11/28/18 20:54


Patient symptoms consistent with urinary tract infection, urinalysis confirms 

this, lungs are clear to auscultation at time of my evaluation, she does have 

what appears to be an early abscess on the nominal wall and was started on both 

Bactrim and Keflex which should cover the early abscess as well as the urinary 

tract infection, patient was advised to follow-up with her primary care 

provider or return if symptoms worsen, patient acknowledges understanding and 

agreement with this plan





- Vital Signs


Vital signs: 


 











Temp Pulse Resp BP Pulse Ox


 


 98.2 F   101 H  19   140/85 H  99 


 


 11/28/18 19:48  11/28/18 19:48  11/28/18 19:48  11/28/18 19:48  11/28/18 19:48














- Laboratory


Laboratory results interpreted by me: 


 











  11/28/18





  19:40


 


Urine Blood  LARGE H


 


Ur Leukocyte Esterase  MODERATE H














Discharge





- Discharge


Clinical Impression: 


UTI (urinary tract infection)


Qualifiers:


 Urinary tract infection type: site unspecified Hematuria presence: without 

hematuria Qualified Code(s): N39.0 - Urinary tract infection, site not specified





Subcutaneous abscess


Qualifiers:


 Site of cutaneous abscess: trunk Site of cutaneous abscess of trunk: abdominal 

wall Qualified Code(s): L02.211 - Cutaneous abscess of abdominal wall





Condition: Stable


Disposition: HOME, SELF-CARE


Instructions:  Abscess ([facility]), Cephalexin ([facility]), Trimethoprim-Sulfa ([facility]), 

Urinary Tract Infection ([facility])


Additional Instructions: 


Follow up with your primary care provider in one to 2 days.  Return to the 

emergency room immediately if symptoms worsen or any additional concerns.


Prescriptions: 


Cephalexin Monohydrate [Keflex 500 mg Capsule] 500 mg PO BID #20 capsule


Phenazopyridine HCl [Pyridium 200 mg Tablet] 200 mg PO TID #15 tablet


Sulfamethoxazole/Trimethoprim [Bactrim Ds Tablet] 1 each PO BID #20 tablet


Referrals: 


ZULMA ARIZMENDI MD [Primary Care Provider] - Follow up as needed

## 2018-11-29 NOTE — EKG REPORT
SEVERITY:- OTHERWISE NORMAL ECG -

SINUS TACHYCARDIA

:

Confirmed by: Luis Matute MD 29-Nov-2018 07:42:59

## 2019-10-07 NOTE — RADIOLOGY REPORT (SQ)
EXAM DESCRIPTION:  CHEST 2 VIEWS



COMPLETED DATE/TIME:  10/7/2019 6:47 pm



REASON FOR STUDY:  cough



COMPARISON:  5/27/2018



EXAM PARAMETERS:  NUMBER OF VIEWS: two views

TECHNIQUE: Digital Frontal and Lateral radiographic views of the chest acquired.

RADIATION DOSE: NA

LIMITATIONS: none



FINDINGS:  LUNGS AND PLEURA: No opacities, masses or pneumothorax. No pleural effusion.

MEDIASTINUM AND HILAR STRUCTURES: No masses or contour abnormalities.

HEART AND VASCULAR STRUCTURES: Heart normal size.  No evidence for failure.

BONES: No acute findings.

HARDWARE: None in the chest.

OTHER: No other significant finding.



IMPRESSION:  NO ACUTE RADIOGRAPHIC FINDING IN THE CHEST.



TECHNICAL DOCUMENTATION:  JOB ID:  [id number]

 2011 [employer]- All Rights Reserved



Reading location - IP/workstation name: [email or web address]

## 2019-10-07 NOTE — ER DOCUMENT REPORT
ED Respiratory Problem





- General


Chief Complaint: Cough


Stated Complaint: BREATHING PROBLEMS, CHEST PAIN


Time Seen by Provider: 10/07/19 17:14


Primary Care Provider: 


ZULMA ARIZMENDI MD [NO LOCAL MD] - Follow up tomorrow


Mode of Arrival: Ambulatory


Information source: Patient


Notes: 





46-year-old female presented to ED for cough and congestion for over a month.  

She states is been much worse over the last 2 weeks.  She states she has had 

sinus congestion and nasal congestion.  She states her chest was very tight and 

she does think this is her asthma.  She did receive nebulizer treatments and 

steroids in the pit area.  She states she is feeling much better at this time.  

I have reviewed her labs and she does have some decrease in kidney function.  

Her hemoglobin is 9.5 and on her last visit a year and a half ago it was 10.1.  

This is not a significant drop over a year and a half but I have discussed this 

with the patient to be sure that she follows up with her doctor.  She states she

is on a keto diet and has been losing weight I have instructed her that she 

needs to stop the keto diet until she follows up with her primary doctor due to 

the drop in her kidney function.  We will treat her with a liter of fluids now 

and check her urine.


TRAVEL OUTSIDE OF THE [place] IN LAST 30 DAYS: No





- HPI


Patient complains to provider of: Asthma, Cough, Short of breath


Onset: Other - Months


Duration: Better - States is much better after the nebulizer treatments


Initiating Event: URI


Quality of pain: No pain - She is not having any pain at this time


Severity: None


Pain Level: Denies


Context: Hx asthma


Cough: Nonproductive, Productive


Sputum amount: Small


Sputum color: Green


At home treatment: Bronchodilators


Associated symptoms: Congestion, Cough, Sinus pain/pressure, Short of breath


Similar symptoms previously: Yes


Recently seen / treated by doctor: No





- Related Data


Allergies/Adverse Reactions: 


                                        





adhesive Allergy (Verified 10/07/19 17:09)


   


flu vaccine Allergy (Uncoded 10/07/19 17:09)


   











Past Medical History





- General


Information source: Patient





- Social History


Smoking Status: Never Smoker


Chew tobacco use (# tins/day): No


Frequency of alcohol use: None


Drug Abuse: None


Lives with: Family


Family History: Arthritis, CAD, CVA, Hyperlipidemia, Hypertension, Malignancy, 

Other - Sister has allergy and asthma.  denies: COPD, DM, Thyroid Disfunction


Patient has suicidal ideation: No


Patient has homicidal ideation: No





- Past Medical History


Cardiac Medical History: Reports: Hx Hypertension


Pulmonary Medical History: Reports: Hx Asthma, Hx Bronchitis


Neurological Medical History: Reports: None


Endocrine Medical History: Reports: Hx Diabetes Mellitus Type 2 - diet 

controlled


Renal/ Medical History: Reports: Hx Kidney Stones


Malignancy Medical History: Reports: None


GI Medical History: Reports: None


Musculoskeletal Medical History: Reports Hx Arthritis - Chronic low back pain, 

bursitis and plantar fasciitis all on the right side, Reports Hx Musculoskeletal

Deformity


Skin Medical History: Reports None


Psychiatric Medical History: Reports: None


Traumatic Medical History: Reports: None


Infectious Medical History: Reports: None


Past Surgical History: Reports: Hx Kidney (Renal Surgery) - lithotripsy





- Immunizations


Immunizations up to date: Yes


Hx Diphtheria, Pertussis, Tetanus Vaccination: No - >5 yrs





Review of Systems





- Review of Systems


Constitutional: No symptoms reported


EENT: Nose discharge, Sinus discharge


Cardiovascular: No symptoms reported


Respiratory: Cough - None at this time, Short of breath - None at this time, 

Sputum - States she had green sputum at home, Wheezing - None at this time


Gastrointestinal: No symptoms reported


Genitourinary: No symptoms reported


Female Genitourinary: No symptoms reported


Musculoskeletal: No symptoms reported


Skin: No symptoms reported


Hematologic/Lymphatic: No symptoms reported


Neurological/Psychological: No symptoms reported


-: Yes All other systems reviewed and negative





Physical Exam





- Vital signs


Vitals: 


                                        











Temp Pulse Resp BP Pulse Ox


 


 98.4 F   89   20   165/77 H  97 


 


 10/07/19 17:05  10/07/19 17:05  10/07/19 17:05  10/07/19 17:05  10/07/19 17:05











Interpretation: Normal





- General


General appearance: Appears well, Alert





- HEENT


Head: Normocephalic, Atraumatic


Eyes: Normal


Pupils: PERRL





- Respiratory


Respiratory status: No respiratory distress


Chest status: Nontender


Breath sounds: Normal


Chest palpation: Normal





- Cardiovascular


Rhythm: Regular


Heart sounds: Normal auscultation


Murmur: No





- Abdominal


Inspection: Normal


Distension: No distension


Bowel sounds: Normal


Tenderness: Nontender


Organomegaly: No organomegaly





- Back


Back: Normal, Nontender





- Extremities


General upper extremity: Normal inspection, Nontender, Normal color, Normal ROM,

Normal temperature


General lower extremity: Normal inspection, Nontender, Normal color, Normal ROM,

Normal temperature, Normal weight bearing.  No: Roly's sign





- Neurological


Neuro grossly intact: Yes


Cognition: Normal


Orientation: AAOx4


Harlan Coma Scale Eye Opening: Spontaneous


Harlan Coma Scale Verbal: Oriented


Harlan Coma Scale Motor: Obeys Commands


Harlan Coma Scale Total: 15


Speech: Normal


Motor strength normal: LUE, RUE, LLE, RLE


Sensory: Normal





- Psychological


Associated symptoms: Normal affect, Normal mood





- Skin


Skin Temperature: Warm


Skin Moisture: Dry


Skin Color: Normal





Course





- Re-evaluation


Re-evalutation: 





10/08/19 03:12


Patient was seen for cough congestion wheezing headache and exacerbation of her 

asthma.  She was treated in the triage area with DuoNeb's and steroids.  Her lab

results did show a decrease in her renal function.  She was treated with a liter

of fluids after I consulted Dr. Cain.  Patient was able to void urine was 

clear.  I did give patient a copy of the results and instructed her that she 

needed to follow-up with her primary doctor tomorrow and reviewed the lab res

ults and possibly follow-up with a urologist or nephrologist.  Patient was 

discharged home with prescription for prednisone and albuterol inhaler.  Patient

was able to verbalize understanding and agreement with treatment plan.





- Vital Signs


Vital signs: 


                                        











Temp Pulse Resp BP Pulse Ox


 


 97.6 F   68   18   139/70 H  98 


 


 10/08/19 00:15  10/08/19 00:15  10/08/19 00:15  10/08/19 00:15  10/08/19 00:15














- Laboratory


Result Diagrams: 


                                 10/07/19 18:40





                                 10/07/19 18:40


Laboratory results interpreted by me: 


                                        











  10/07/19 10/07/19 10/07/19





  18:40 18:40 22:56


 


WBC  11.4 H  


 


Hgb  9.5 L  


 


Hct  30.3 L  


 


MCV  72 L  


 


MCH  22.5 L  


 


MCHC  31.3 L  


 


RDW  18.3 H  


 


Eos % (Auto)  6.5 H  


 


Absolute Eos (auto)  0.7 H  


 


BUN   21 H 


 


Creatinine   1.98 H 


 


Est GFR ( Amer)   33 L 


 


Est GFR (MDRD) Non-Af   27 L 


 


Total Bilirubin   0.1 L 


 


Ur Leukocyte Esterase    TRACE H














- Diagnostic Test


Radiology reviewed: Image reviewed, Reports reviewed





Discharge





- Discharge


Clinical Impression: 


 Renal insufficiency





URI (upper respiratory infection)


Qualifiers:


 URI type: unspecified viral URI Qualified Code(s): J06.9 - Acute upper 

respiratory infection, unspecified





Asthma exacerbation


Qualifiers:


 Asthma severity: mild Asthma persistence: unspecified Qualified Code(s): 

J45.901 - Unspecified asthma with (acute) exacerbation





Condition: Stable


Disposition: HOME, SELF-CARE


Additional Instructions: 


Asthma





     You have been diagnosed with exacerbation of your asthma.  This is a 

condition where there is episodic tightness in the bronchial tubes.  Allergies, 

infections, and polluted or cold air may be contributing factors.


     Emergency treatment of a severe asthma attack may include adrenaline shots,

or bronchodilator aerosol.  You may feel lightheaded, have a decreased exercise 

tolerance and a rapid pulse for an hour or two.  Rest and get plenty of fluids.


     Home treatment of asthma requires bronchodilator drugs.  These can be 

administered by injection, inhalation, or by mouth.  Antibiotics and keegan

icosteroids may be required for some patients.  You should avoid chemical fumes,

dusts, pollens, and exercising in very cold or dry air. If you smoke, stop!!


     If you develop a fever, increased wheezing, chest pain, or severe shortness

of breath, you should contact the doctor immediately





Your labs showed that you had renal insufficiency while you were in the 

emergency room.  I have discussed these labs with you and given you a written 

report of these labs.  Please stop your keto diet until you have spoken with 

your doctor and your renal function has improved.  I have also given you a liter

of fluids to help with your renal function.





UPPER RESPIRATORY ILLNESS:





     You have a viral infection of the respiratory passages -- a "cold."  This 

common infection causes nasal congestion, drainage, and often sore throat and 

cough.  It is highly contagious.  The disease usually lasts about 10 to 14 days.


     There is no "cure" for the viral infection -- it must run its course.  If 

there is a complication, such as bacterial infection in the nose, sinuses, 

middle ear, or bronchial tubes, antibiotics may be required.  The antibiotics 

won't affect the virus.


     Drink plenty of fluids.  A humidifier may help.  An expectorant medication 

or decongestant may make you more comfortable.  Use acetaminophen or ibuprofen 

for fever or aches.


     See the doctor if fever persists over two days, if there is any significant

worsening of your symptoms, or if you simply fail to improve as expected.








BRONCHOSPASM:





     You have tightness in the bronchial tubes, called bronchospasm. This often 

occurs with bronchial infections.  Allergies, inhaled chemicals, and polluted or

cold air can also provoke bronchospasm. It's more likely in patients with asthma

in the family.


     Emergency treatment of bronchospasm may include adrenaline shots or 

bronchodilator aerosol.  You may feel lightheaded and have a rapid pulse for an 

hour or two.  Rest and get plenty of fluids.


     At home, we'll treat you with a bronchodilator inhaler. Antibiotics and 

corticosteroids may be required for some patients. Until you recover, avoid 

chemical fumes, dusts, pollens, and exercising in very cold or dry air. If you 

smoke, stop now!!


     If you develop a fever, increased wheezing, chest pain, or severe shortness

of breath, you should contact the doctor immediately.








COUGH-SUPPRESSANT & EXPECTORANT MEDICATION:


     You are to use a cough medication as needed for relief of symptoms.  This 

medicine is a combination of an expectorant (to make the mucous thinner and more

easily "coughed up") and a cough suppressant (to reduce the frequency of 

coughing).


     The cough-suppressant medicine is related to narcotics.  You may experience

mild nausea and sleepiness.  Some patients who are very sensitive to narcotics 

may have stomach pain from this medicine. Taking the medicine with food reduces 

these side effects.  Do not drive or work with machinery until you know how this

medicine affects you.


     The expectorant should have no side effects.  Iodine-containing 

expectorants (such as organidin) should not be taken by persons with active 

thyroid disease unless approved by your doctor.


     Call the doctor if you develop shortness of breath, hives, rash, itching, 

lightheadedness, or severe nausea and vomiting.








INHALED BRONCHODILATORS:


     You have received a treatment of and/or prescription for an inhaled 

bronchodilator -- a medication which stimulates the airways in the lung to di

late.  This improves the flow of air in asthma, bronchitis, and emphysema.


     These medicines have some similarity to adrenaline, and can cause similar 

side effects:  shakiness, racing heart, and a sense of nervousness.  These side 

effects decrease with time.  Contact your doctor if these side effects are 

severe.


     Do not over-use the medicine.  Too-frequent use of the inhaler may make it 

ineffective.  Call your doctor if the inhaler is not controlling your symptoms 

at the prescribed doses.








STEROID MEDICATION:


     You have been given an injection of or oral medicine of the cortisone/st

eroid class.  This medication is used to control inflammation or allergy.  Dean t

is usually only given for a short period of time, until the acute process 

subsides.


     There are usually no side effects from short-term use of cortisone-like 

medications.  Some persons feel an increased sense of well-being and are not 

sleepy at bedtime.  Long-term use of cortisone medications is best avoided, 

unless required for a severe condition.  If your condition does not remit, or 

relapses after the course of corticosteroid medication, you should consult your 

physician.








USE OF ACETAMINOPHEN (Tylenol):


     Acetaminophen may be taken for pain relief or fever control. It's much 

safer than aspirin, offering a wider range of "safe" dosages.  It is safe during

pregnancy.  Some brand names are Tylenol, Panadol, Datril, Anacin 3, Tempra, and

Liquiprin. Acetaminophen can be repeated every four hours.  The following are 

maximum recommended dosages:


>89 pounds or adults          650 mg to 900 mg


Acetaminophen can be repeated every four hours.  Maximum dose not to exceed 4000

mg a day.





Intravenous (IV) Fluids





     As part of your care today, you received intravenous (IV) fluids.  IV 

fluids are administered to patients who are dehydrated or to those who have 

certain chemical (electrolyte) abnormalities that need correcting.








FOLLOW-UP CARE:


If you have been referred to a physician for follow-up care, call the 

physicians office for an appointment as you were instructed or within the next 

two days.  If you experience worsening or a significant change in your symptoms,

notify the physician immediately or return to the Emergency Department at any 

time for re-evaluation.





Prescriptions: 


Prednisone [Deltasone 20 mg Tablet] 2 tab PO DAILY 5 Days  tablet


Albuterol Sulfate [Proair HFA Inhalation Aerosol 8.5 gm MDI] 2 puff IH Q4H PRN 

#1 mdi


 PRN Reason: 


Forms:  Elevated Blood Pressure


Referrals: 


ZULMA ARIZMENDI MD [NO LOCAL MD] - Follow up tomorrow

## 2019-10-07 NOTE — ER DOCUMENT REPORT
ED Medical Screen (RME)





- General


Chief Complaint: Cough


Stated Complaint: BREATHING PROBLEMS, CHEST PAIN


Time Seen by Provider: 10/07/19 17:14


Primary Care Provider: 


ZULMA ARIZMENDI MD [Primary Care Provider] - Follow up as needed


Information source: Patient


Notes: 





Patient presents stating that she has had a cough for the past month that 

worsened over the past 2 weeks.  Patient reports sinus congestion as well as 

headache that she attributes to coughing.  Patient complains of chest tightness 

and a flareup of her asthma.  Patient is out of all of her asthmatic 

medications.





I have greeted and performed a rapid initial assessment of this patient.  A 

comprehensive ED assessment and evaluation of the patient, analysis of test 

results and completion of the medical decision making process will be conducted 

by additional ED providers.


TRAVEL OUTSIDE OF THE [place] IN LAST 30 DAYS: No





- Related Data


Allergies/Adverse Reactions: 


                                        





adhesive Allergy (Verified 10/07/19 17:09)


   


flu vaccine Allergy (Uncoded 10/07/19 17:09)


   











Past Medical History





- Social History


Chew tobacco use (# tins/day): No


Frequency of alcohol use: None


Drug Abuse: None


Family history: Reviewed & Not Pertinent





- Past Medical History


Cardiac Medical History: Reports: Hx Hypertension


Pulmonary Medical History: Reports: Hx Asthma, Hx Bronchitis


Endocrine Medical History: Reports: Hx Diabetes Mellitus Type 2 - diet 

controlled


Renal/ Medical History: Reports: Hx Kidney Stones.  Denies: Hx Peritoneal 

Dialysis


Musculoskeltal Medical History: Reports Hx Arthritis - Chronic low back pain, 

bursitis and plantar fasciitis all on the right side, Reports Hx Musculoskeletal

Deformity


Past Surgical History: Reports: Hx Kidney (Renal Surgery) - lithotripsy





- Immunizations


Immunizations up to date: Yes


Hx Diphtheria, Pertussis, Tetanus Vaccination: No - >5 yrs





Physical Exam





- Vital signs


Vitals: 





                                        











Temp Pulse Resp BP Pulse Ox


 


 98.4 F   89   20   165/77 H  97 


 


 10/07/19 17:05  10/07/19 17:05  10/07/19 17:05  10/07/19 17:05  10/07/19 17:05














- Respiratory


Respiratory status: No respiratory distress


Chest status: Pain with cough


Breath sounds: Nonproductive cough, Wheezing





Course





- Vital Signs


Vital signs: 





                                        











Temp Pulse Resp BP Pulse Ox


 


 98.4 F   89   20   165/77 H  97 


 


 10/07/19 17:05  10/07/19 17:05  10/07/19 17:05  10/07/19 17:05  10/07/19 17:05














Doctor's Discharge





- Discharge


Referrals: 


ZULMA ARIZMENDI MD [Primary Care Provider] - Follow up as needed

## 2020-02-14 NOTE — RADIOLOGY REPORT (SQ)
EXAM DESCRIPTION:  CHEST 2 VIEWS



COMPLETED DATE/TIME:  2/14/2020 2:56 pm



REASON FOR STUDY:  cough



COMPARISON:  PA and lateral views of the chest from 10/7/2019.



EXAM PARAMETERS:  NUMBER OF VIEWS: Two views.

TECHNIQUE:  PA and lateral views of the chest were obtained..

RADIATION DOSE: NA

LIMITATIONS: none



FINDINGS:  LUNGS AND PLEURA: No consolidation, pleural effusion or pneumothorax.

MEDIASTINUM AND HILAR STRUCTURES: No mediastinal or hilar contour abnormality.

HEART AND VASCULAR STRUCTURES: The cardiac silhouette and pulmonary vasculature are within normal ortiz
its.

BONES: No acute findings.

HARDWARE: None in the chest.

OTHER: No other finding.



IMPRESSION:  No acute cardiopulmonary process.



TECHNICAL DOCUMENTATION:  JOB ID:  [id number]

 2011 [employer]- All Rights Reserved



Reading location - IP/workstation name: [email or web address]

## 2020-02-14 NOTE — ER DOCUMENT REPORT
ED Medical Screen (RME)





- General


Chief Complaint: Dizziness


Stated Complaint: DIZZINESS


Time Seen by Provider: 02/14/20 14:29


TRAVEL OUTSIDE OF THE [place] IN LAST 30 DAYS: No





- HPI


Notes: 





02/14/20 14:32


Patient is a 46-year-old female with history of hypertension (not currently 

medicated) and asthma who presents complaining of harsh dry cough and wheezing 

for the past month.  She did have a course of antibiotics which she has finished

already.  Patient states that her blood pressure was elevated today which 

concerned her and was her primary reason for coming in otherwise.  She was 160s 

over 105 approximately.  Patient states that she did have some dizziness, but is

not sure if it is related to blood pressure or her current illness.  No fever.





I have treated and performed a rapid initial assessment of this patient.  A 

comprehensive ED assessment and evaluation of the patient, analysis of test 

results and completion of medical decision making process will be conducted by 

additional ED providers.





PHYSICAL EXAMINATION:





GENERAL: Well-appearing, well-nourished and in no acute distress.  A&Ox4.  

Answers questions appropriately.


Lungs: Diminished throughout with wheezing bilaterally. Harsh dry cough audible.





- Related Data


Allergies/Adverse Reactions: 


                                        





adhesive Allergy (Verified 02/14/20 14:28)


   


flu vaccine Allergy (Uncoded 02/14/20 14:28)


   











Past Medical History





- Social History


Family history: Reviewed & Not Pertinent





- Past Medical History


Cardiac Medical History: Reports: Hx Hypertension


Pulmonary Medical History: Reports: Hx Asthma, Hx Bronchitis


Endocrine Medical History: Reports: Hx Diabetes Mellitus Type 2 - diet 

controlled


Renal/ Medical History: Reports: Hx Kidney Stones.  Denies: Hx Peritoneal 

Dialysis


Musculoskeltal Medical History: Reports Hx Arthritis - Chronic low back pain, 

bursitis and plantar fasciitis all on the right side, Reports Hx Musculoskeletal

Deformity


Past Surgical History: Reports: Hx Kidney (Renal Surgery) - lithotripsy





- Immunizations


Immunizations up to date: Yes


Hx Diphtheria, Pertussis, Tetanus Vaccination: No - >5 yrs





Physical Exam





- Vital signs


Vitals: 





                                        











Temp Pulse Resp BP Pulse Ox


 


 97.4 F   71   18   155/83 H  99 


 


 02/14/20 14:26  02/14/20 14:26  02/14/20 14:26  02/14/20 14:26  02/14/20 14:26














Course





- Vital Signs


Vital signs: 





                                        











Temp Pulse Resp BP Pulse Ox


 


 97.4 F   71   18   155/83 H  99 


 


 02/14/20 14:26  02/14/20 14:26  02/14/20 14:26  02/14/20 14:26  02/14/20 14:26

## 2020-02-14 NOTE — ER DOCUMENT REPORT
ED General





- General


Chief Complaint: Shortness Of Breath


Stated Complaint: DIZZINESS


Time Seen by Provider: 02/14/20 14:29


Notes: 





Patient is a 46-year-old white female with a past medical history of 

hypertension and allergy induced asthma who presents to the emergency department

with a chief complaint of ongoing wheezing and cough for the past month.  

Patient reports that she is been seen outpatient and had 2 different rounds of 

antibiotics over the past several weeks without any significant improvement.  

She however denies any worsening symptoms either.  She states that she has a 

productive cough of green sputum.  She states it is associated with wheezing.  

She reports she has an albuterol inhaler at home that she has been using.  She 

states that she stopped taking her lisinopril because it was causing a dry 

cough.  She did not alert her primary care doctor to this side effect, she just 

discontinued the medication.  She was worried about her blood pressure at home 

given this illness, checked it noted to be elevated.  She reports blood pressure

recorded here was better.  She denies any headache or dizziness.  Denies any 

chest pain.  Denies any lower extremity pain or swelling.  No hemoptysis.  

Patient denies any urinary complaints.


TRAVEL OUTSIDE OF THE [place] IN LAST 30 DAYS: No





- Related Data


Allergies/Adverse Reactions: 


                                        





adhesive Allergy (Verified 02/14/20 14:28)


   


flu vaccine Allergy (Uncoded 02/14/20 14:28)


   











Past Medical History





- Social History


Smoking Status: Current Some Day Smoker


Drug Abuse: Other


Family History: Arthritis, CAD, CVA, Hyperlipidemia, Hypertension, Malignancy, 

Other - Sister has allergy and asthma.  denies: COPD, DM, Thyroid Disfunction


Patient has suicidal ideation: No


Patient has homicidal ideation: No





- Past Medical History


Cardiac Medical History: Reports: Hx Hypertension


Pulmonary Medical History: Reports: Hx Asthma, Hx Bronchitis


Endocrine Medical History: Reports: Hx Diabetes Mellitus Type 2 - diet 

controlled


Renal/ Medical History: Reports: Hx Kidney Stones.  Denies: Hx Peritoneal 

Dialysis


Musculoskeletal Medical History: Reports Hx Arthritis - Chronic low back pain, 

bursitis and plantar fasciitis all on the right side, Reports Hx Musculoskeletal

Deformity


Past Surgical History: Reports: Hx Kidney (Renal Surgery) - lithotripsy





- Immunizations


Immunizations up to date: Yes


Hx Diphtheria, Pertussis, Tetanus Vaccination: No - >5 yrs





Review of Systems





- Review of Systems


Respiratory: Cough, Short of breath, Wheezing


-: Yes All other systems reviewed and negative





Physical Exam





- Vital signs


Vitals: 





                                        











Temp Pulse Resp BP Pulse Ox


 


 97.4 F   71   18   155/83 H  99 


 


 02/14/20 14:26  02/14/20 14:26  02/14/20 14:26  02/14/20 14:26  02/14/20 14:26














- General


General appearance: Appears well, Alert


In distress: None





- HEENT


Head: Normocephalic, Atraumatic


Eyes: Normal


Conjunctiva: Normal


Eyelashes: Normal


Pupils: PERRL


Ears: Normal


External canal: Normal


Tympanic membrane: Normal


Sinus: Normal


Nasal: Normal


Mouth/Lips: Normal


Mucous membranes: Normal


Pharynx: Normal


Neck: Normal





- Respiratory


Respiratory status: No respiratory distress


Chest status: Nontender


Breath sounds: Other - Moving air well throughout, mild wheezes


Chest palpation: Normal





- Cardiovascular


Rhythm: Regular


Heart sounds: Normal auscultation





- Extremities


General upper extremity: Normal inspection, Nontender, Normal color, Normal ROM,

Normal temperature


General lower extremity: Normal inspection, Nontender, Normal color, Normal ROM,

Normal temperature, Normal weight bearing.  No: Roly's sign





- Neurological


Neuro grossly intact: Yes


Cognition: Normal


Orientation: AAOx4


[place] Coma Scale Eye Opening: Spontaneous


[place] Coma Scale Verbal: Oriented


[place] Coma Scale Motor: Obeys Commands


[place] Coma Scale Total: 15


Speech: Normal





- Psychological


Associated symptoms: Normal affect, Normal mood





- Skin


Skin Temperature: Warm


Skin Moisture: Dry


Skin Color: Normal





Course





- Re-evaluation


Re-evalutation: 





02/14/20 18:53


BP has improved here from her reported home BP.  She is well-appearing, in no 

acute distress.  She has no respiratory distress.  Status post breathing treat

ment and Solu-Medrol injection she is moving air very well.  She still has some 

faint wheezes in all lobes.  History and physical consistent with a bronchitis, 

likely asthmatic.  She has albuterol at home.  We will write her for a short 

course of prednisone.  She states that she is prediabetic so we advised she 

monitor her sugars closely.  I discussed with her the importance of blood 

pressure monitoring and control.  She will call her primary care doctor on 

Monday morning for reinitiation of blood pressure medications, I recommended 

losartan as a likely alternative to lisinopril.  Patient states that she does a 

lot of research and she is been using CBD oil and thinks that that helps.  I 

counseled her regarding the importance of outpatient follow-up and advised that 

she return here or any ER immediately with any new, persistent or worsening 

symptoms.  She verbalized understood and agreed.





- Vital Signs


Vital signs: 





                                        











Temp Pulse Resp BP Pulse Ox


 


 97.4 F   71   18   155/83 H  99 


 


 02/14/20 14:26  02/14/20 14:26  02/14/20 14:26  02/14/20 14:26  02/14/20 14:26














- Laboratory


Result Diagrams: 


                                 02/14/20 15:07





                                 02/14/20 15:07


Laboratory results interpreted by me: 





                                        











  02/14/20





  15:07


 


Hgb  11.8 L


 


Hct  34.9 L


 


MCV  77 L


 


MCH  26.0 L


 


RDW  19.2 H














Discharge





- Discharge


Clinical Impression: 


 Bronchitis





Condition: Stable


Disposition: HOME, SELF-CARE


Instructions:  Bronchitis ([facility])


Additional Instructions: 


Follow-up with your doctor on Monday morning for continued care regarding your 

blood pressure and readministration of a blood pressure medicine if warranted.  

They will also reevaluate you from today's visit.  Return here or any ER 

immediately with any new, persistent or worsening symptoms.


Prescriptions: 


Prednisone [Deltasone 20 mg Tablet] 2 tab PO DAILY 5 Days #10 tablet

## 2020-04-09 NOTE — RADIOLOGY REPORT (SQ)
EXAM DESCRIPTION:  CHEST SINGLE VIEW



IMAGES COMPLETED DATE/TIME:  4/9/2020 11:34 am



REASON FOR STUDY:  chest pain with cough



COMPARISON:  2/14/2020.



EXAM PARAMETERS:  NUMBER OF VIEWS: One view.

TECHNIQUE: Single frontal radiographic view of the chest acquired.

RADIATION DOSE: NA

LIMITATIONS: None.



FINDINGS:  LUNGS AND PLEURA: No opacities, masses or pneumothorax. No pleural effusion.

MEDIASTINUM AND HILAR STRUCTURES: No masses.  Contour normal.

HEART AND VASCULAR STRUCTURES: Heart normal in size.  Normal vasculature.

BONES: No acute findings.

HARDWARE: None in the chest.

OTHER: No other significant finding.



IMPRESSION:  NO ACUTE RADIOGRAPHIC FINDING IN THE CHEST.



TECHNICAL DOCUMENTATION:  JOB ID:  [id number]

 2011 [employer]- All Rights Reserved



Reading location - IP/workstation name: [email or web address]

## 2020-04-09 NOTE — ER DOCUMENT REPORT
ED General





- General


Chief Complaint: Chest Pain


Stated Complaint: SHORTNESS OF BREATH/CHEST PAIN


Time Seen by Provider: 04/09/20 11:09


TRAVEL OUTSIDE OF THE [place] IN LAST 30 DAYS: No





- HPI


Notes: 





46-year-old female presents emergency room for evaluation of chest wall pain 

that is right-sided that is worse with taking a deep breath since 5:00am this 

morning.  This feels like the last time she had costochondritis.  Patient does 

have a history of asthma and is currently being treated on outpatient prednisone

and also an inhaler.  States she sometimes has bronchitis as well.  Denies any 

trauma, no recent falls.  denies fevers, chills,palpitations,  shortness of 

breath, dyspnea, nausea, vomiting, diarrhea, abdominal pain, hematuria,blurred 

vision, double vision, loss of vision, speech changes, LH, dizziness, syncope, 

headaches, wheezing, ST, URI, neck pain, weakness, bowel or bladder dysfunction,

saddle anesthesia, numbness or tingling in bilateral upper or lower extremities 

equally, muscle paralysis, weakness in bilateral upper or lower extremities 

equally or rash. Denies IV drug use.





- Related Data


Allergies/Adverse Reactions: 


                                        





adhesive Allergy (Verified 02/14/20 14:28)


   


flu vaccine Allergy (Uncoded 02/14/20 14:28)


   











Past Medical History





- General


Information source: Patient





- Social History


Smoking Status: Never Smoker


Family History: Arthritis, CAD, CVA, Hyperlipidemia, Hypertension, Malignancy, 

Other - Sister has allergy and asthma.  denies: COPD, DM, Thyroid Disfunction





- Past Medical History


Cardiac Medical History: Reports: Hx Hypertension


Pulmonary Medical History: Reports: Hx Asthma, Hx Bronchitis


Endocrine Medical History: Reports: Hx Diabetes Mellitus Type 2 - diet 

controlled


Renal/ Medical History: Reports: Hx Kidney Stones.  Denies: Hx Peritoneal 

Dialysis


Musculoskeletal Medical History: Reports Hx Arthritis - Chronic low back pain, 

bursitis and plantar fasciitis all on the right side, Reports Hx Musculoskeletal

Deformity


Past Surgical History: Reports: Hx Kidney (Renal Surgery) - lithotripsy





- Immunizations


Immunizations up to date: Yes


Hx Diphtheria, Pertussis, Tetanus Vaccination: No - >5 yrs





Review of Systems





- Review of Systems


Constitutional: No symptoms reported


EENT: No symptoms reported


Cardiovascular: See HPI


Respiratory: No symptoms reported


Gastrointestinal: No symptoms reported


Genitourinary: No symptoms reported


Female Genitourinary: No symptoms reported


Musculoskeletal: No symptoms reported


Skin: No symptoms reported


Hematologic/Lymphatic: No symptoms reported


Neurological/Psychological: No symptoms reported





Physical Exam





- Vital signs


Vitals: 


                                        











Temp Pulse Resp BP Pulse Ox


 


 98.3 F   72   20   144/73 H  100 


 


 04/09/20 10:55  04/09/20 10:55  04/09/20 10:55  04/09/20 10:55  04/09/20 10:55














- Notes


Notes: 





PHYSICAL EXAMINATION: reviewed vital signs by RN





GENERAL: Well-appearing, well-nourished and in no acute distress.





HEAD: Atraumatic, normocephalic.





EYES: Pupils equal round and reactive to light, extraocular movements intact, 

conjunctiva are normal.





ENT: Nares patent, oropharynx clear without exudates.  Moist mucous membranes.





NECK: Normal range of motion, supple without lymphadenopathy





LUNGS: Breath sounds clear to auscultation bilaterally and equal.  No wheezes 

rales or rhonchi.





HEART: Regular rate and rhythm without murmurs.  Reproducible chest pain to 

right chest wall, patient states this is the chest pain that brought her to the 

emergency room





ABDOMEN: Soft, nontender, nondistended abdomen.  No guarding, no rebound.  No 

masses appreciated.





Female : deferred





Musculoskeletal: Normal range of motion, no pitting or edema.  No cyanosis.





NEUROLOGICAL: Cranial nerves grossly intact.  Normal speech, normal gait.  

Normal sensory, motor exams





PSYCH: Normal mood, normal affect.





SKIN: Warm, Dry, normal turgor, no rashes or lesions noted.





Course





- Re-evaluation


Re-evalutation: 





04/09/20 18:31


AFebrile vital stable no distress.  Nurses notes reviewed.  CBC negative for 

leukocytosis or anemia, CMP negative for hepatic or renal dysfunction, no 

electrolyte disturbances.  EKG negative for STEMI, no ST segment changes, chest 

x-ray unremarkable.  Urinalysis negative for leukoesterase, hCG negative.  2 

sets of troponins done 3 hours apart negative.  Heart score is less than 3. 

Presentation of chest pain in an otherwise well appearing patient. Low clinical 

suspicion for ACS given clinical history, exam, EKG without ST elevations or 

depressions, and negative initial troponin. HEART score less than or equal to 3.

PE also seems unlikely given clinical history, absence of tachycardia or 

dyspnea. Patient is PERC criteria negative. CXR without evidence of pneumothorax

or pneumonia. No widened mediastinum. Aortic dissection also seems unlikely 

given history, symmetric pulses, CXR, and vitals. 





HEART Score:





History 0      


ECG 0      


Age 1      


Risk Factors 1


Troponin 0   





Total: 2





Chest pain in a patient without evidence of cardiac or other serious etiology on

workup today. I discussed with patient that, based on their age, risk factors 

and emergency department testing today, the likelihood that their symptoms are 

related to a heart attack is very low (estimated risk of heart attack or death 

over the next 30 days of less than 1%).  After performing a Medical Screening 

Examination, I estimate there is LOW risk for RUPTURED ESOPHAGUS, PNEUMOTHORAX, 

PULMONARY EMBOLISM, ACUTE CORONARY SYNDROME, OR THORACIC AORTIC DISSECTION, thus

I consider the discharge disposition reasonable.  I have reevaluated this 

patient multiple times and no significant life threatening changes are noted. 

The patient and I have discussed the diagnosis and risks, and we agree with 

discharging home with close follow-up. We also discussed returning to the 

Emergency Department immediately if new or worsening symptoms occur. We have 

discussed the symptoms which are most concerning (e.g., bloody sputum, worsening

pain or shortness of breath) that necessitate immediate return.  The patient 

demonstrates decision making capacity and has verbalized an understanding of 

these risks to me. Based on this,  the patient has chosen to follow-up as an 

outpatient. Usual chest pain return precautions reviewed. The patient states 

understanding and agreement with this plan.


04/09/20 18:34








- Vital Signs


Vital signs: 


                                        











Temp Pulse Resp BP Pulse Ox


 


 98.3 F   72   17   125/64   98 


 


 04/09/20 10:55  04/09/20 10:55  04/09/20 17:01  04/09/20 17:01  04/09/20 17:01














- Laboratory


Result Diagrams: 


                                 04/09/20 11:40





                                 04/09/20 11:40


Laboratory results interpreted by me: 


                                        











  04/09/20 04/09/20





  11:40 11:40


 


WBC  15.7 H 


 


Hgb  10.6 L 


 


Hct  32.3 L 


 


MCV  79 L 


 


MCH  26.0 L 


 


RDW  17.9 H 


 


Absolute Neuts (auto)  9.9 H 


 


Absolute Monos (auto)  1.5 H 


 


Sodium   136.1 L


 


BUN   21 H














Discharge





- Discharge


Clinical Impression: 


 Costochondritis, acute





Condition: Stable


Disposition: HOME, SELF-CARE


Instructions:  Chest Wall Pain ([facility])


Additional Instructions: 


Cardiac enzymes were negative, all of your labs were normal.  Please follow-up 

with your primary care provider for your costochondritis.You were seen today for

chest pain.  The exact cause of your pain is unclear. However, based on your 

cardiac enzyme testing, chest x-ray, and EKG it does not appear that it is from 

an immediately life-threatening cause at this time.  Although your testing here 

is normal is critical that you follow-up with your primary care physician for 

continued evaluation of this chest pain and possible stress testing.  I 

recommended you see your physician within the next 24-48 hours to be evaluated 

for consideration of a stress test.  Please return to emergency department 

immediately if you have worsening of your chest pain, shortness of breath, vomit

ing, become unable to exert yourself due to pain or difficulty breathing, you 

pass out, or have any pain that radiates into your arms, jaw, or back. Please 

also return if you have any additional symptoms that are concerning to you.


Prescriptions: 


Naproxen 500 mg PO BID #10 tablet


Referrals: 


TREVON BARRETT MD [Primary Care Provider] - Follow up as needed


NAE CELESTIN MD [ACTIVE PROVISIONAL STAFF] - Follow up as needed